# Patient Record
Sex: FEMALE | Race: WHITE
[De-identification: names, ages, dates, MRNs, and addresses within clinical notes are randomized per-mention and may not be internally consistent; named-entity substitution may affect disease eponyms.]

---

## 2017-08-23 NOTE — OR
DATE OF PROCEDURE:  08/22/2017

 

PREOPERATIVE DIAGNOSES:

1. History of gastroesophageal reflux disease.

2. Remote history of colitis (now thought to be primarily irritable bowel syndrome).

 

POSTOPERATIVE DIAGNOSES:

1. Recurrence of hiatal hernia without gross inflammation at esophagogastric junction.

    a.     Mild antral gastritis.

2. Normal colonoscopic examination.

 

OPERATIVE PROCEDURES:

1. Esophagogastroduodenoscopy with:.

    a.     Biopsies esophagogastric junction for histologic evaluation.

    b.     Biopsies of antrum for CLOtest.

2. Flexible colonoscopy.

 

ANESTHESIA:  IV sedation.

 

INDICATION FOR PROCEDURE:  The patient is status post Nissen fundoplication in 2005.  She

was felt to have an endoscopy some time ago to have some recurrence of the stuff.  She is

not presently on any antisecretory medications and has little in the way of reflux symptoms.

Plan is to proceed with upper endoscopy with biopsies as indicated.

 

The patient also has a remote history of some colitis of some sort.  She now thinks that her

diagnosis is more of an irritable bowel syndrome.  She is to undergo a colonoscopy for

evaluation of the colon.  Potential risks of the procedure including bleeding and

perforation were discussed, and the patient wishes to proceed.

 

DETAILS OF PROCEDURE:  The patient was taken to the operating room and after being placed in

a left lateral decubitus position, the upper GI endoscope was passed orally through the

length of the esophagus and stomach with retroflexion view of the fundus, and thereafter

through the pyloric channel into the junction of the third and fourth portions of the

duodenum.

 

Findings included normal hypopharynx, larynx, upper esophageal sphincter, and esophageal

body at the EG junction.  There was no significant inflammation.  There was some recurrence

of the hiatal hernia in a symmetric manner but again there's no stricturing and no gross

inflammation at this distal esophagus.  There was no upper distension of the

gastroesophageal junction mucosal line.

 

Apart from the hiatal hernia within the stomach, there was some erosive gastritis within the

body and antrum.  There were no true ulcers present.  The pyloric channel and visualized

portion of the duodenum were unremarkable.

 

At this point, biopsies were obtained from the antrum and sent for CLOtest for H. pylori and

then multiple biopsies obtained from the esophagogastric junction to evaluate that

histologically.  Minimal bleeding from the biopsy sites was seen and the procedure then

concluded.

 

Attention was then taken to the colonoscopy.  Initial digital rectal exam was performed and

was unremarkable.  Colonoscope was then passed into the rectum where retroflexion revealing

uncomplicated hemorrhoidal columns.  The scope was then passed eventually to the cecum.  The

prep was fairly good with only being a small amount of liquid stool present to that level.

There were no abnormalities noted.  Specifically, there were no areas of colitis or

diverticular disease and no polyps or other signs of neoplasia.  The scope was then

withdrawn.  The above findings reconfirmed.  The procedure was then concluded.

 

Given the erosive gastritis, the patient will be given some Protonix IV in the recovery room

and then we will start her on Zantac 300 mg a day.  She will be following up with Michelle Enrique as arranged.

 

 

 

 

Lino Desai MD

DD:  08/22/2017 14:39:41

DT:  08/22/2017 20:30:35

Job #:  [id number]

## 2021-06-15 ENCOUNTER — HOSPITAL ENCOUNTER (OUTPATIENT)
Dept: HOSPITAL 11 - JP.SDS | Age: 66
Discharge: HOME | End: 2021-06-15
Attending: SURGERY
Payer: MEDICARE

## 2021-06-15 VITALS — HEART RATE: 56 BPM | DIASTOLIC BLOOD PRESSURE: 70 MMHG | SYSTOLIC BLOOD PRESSURE: 106 MMHG

## 2021-06-15 DIAGNOSIS — K64.9: Primary | ICD-10-CM

## 2021-06-15 DIAGNOSIS — E66.9: ICD-10-CM

## 2021-06-15 DIAGNOSIS — Z98.890: ICD-10-CM

## 2021-06-15 DIAGNOSIS — K44.9: ICD-10-CM

## 2021-06-15 DIAGNOSIS — K21.9: ICD-10-CM

## 2021-06-15 PROCEDURE — 43239 EGD BIOPSY SINGLE/MULTIPLE: CPT

## 2021-06-15 PROCEDURE — 88305 TISSUE EXAM BY PATHOLOGIST: CPT

## 2021-06-15 PROCEDURE — 45378 DIAGNOSTIC COLONOSCOPY: CPT

## 2021-06-15 PROCEDURE — 87081 CULTURE SCREEN ONLY: CPT

## 2021-06-23 NOTE — OR
DATE OF PROCEDURE:  06/15/2021

 

SURGEON:  Lino Desai MD

 

PREOPERATIVE DIAGNOSES:

1. History of gastroesophageal reflux disease status post Nissen fundoplication in 2005.

2. History of ulcerative colitis.

 

POSTOPERATIVE DIAGNOSES:

1. Recurrent hiatal hernia status post Nissen fundoplication (2005) with minimal

    inflammation at esophagogastric junction.

2. Normal colonoscopic examination.

 

OPERATIVE PROCEDURES:

1. Esophagogastroduodenoscopy with:

    a.     Biopsy of esophagogastric junction for histologic evaluation.

    b.     Biopsies of antrum for CLOtest.

2. Flexible colonoscopy.

 

ANESTHESIA:  IV sedation.

 

INDICATIONS FOR THE PROCEDURE:  This is a 65-year-old female presenting for followup upper

endoscopy and colonoscopy for the above-noted history.  Presently, the patient has no

medications directed towards the upper GI tract and no significant symptoms of reflux.

Likewise, with regard to her colon, she has had no diarrhea, cramping, or bleeding for now

several years, off any specific treatment for inflammatory bowel disease.  Plan is to

proceed with upper and lower endoscopy with biopsies and polypectomy as indicated.

Potential risks including bleeding and perforation were discussed, and the patient wishes to

proceed.

 

DETAILS OF PROCEDURE:  The patient was taken to the operating room, placed in a left lateral

decubitus position.  IV sedation was administered, after which the upper GI endoscope was

passed orally through the length of the esophagus and into the stomach with retroflexion

view of the fundus, and thereafter through the pyloric channel into the junction of the 3rd

and 4th portions of the duodenum.

 

Findings included normal hypopharynx, larynx, upper esophageal sphincter, and esophageal

body.  At the EG junction, the patient was noted to have some recurrence of the hiatal

hernia, but otherwise intact Nissen fundoplication.  There is no gross inflammation or

stricturing noted and no significant upward migration of columnar mucosa.  The remainder of

the gastric and duodenal exams were unremarkable.  At this point, biopsies were obtained

from the antrum for screening of H pylori.  Then, multiple biopsies were obtained from

esophagogastric junction for screening for Sparrow esophagus.  No bleeding from the biopsy

sites was seen and the procedure then concluded.

 

Attention was then taken to the colonoscopy.  Initial digital rectal exam was performed, it

was unremarkable.  Colonoscope was passed into the rectum with retroflexion revealing

uncomplicated hemorrhoidal columns.  Scope was eventually passed to the level of the cecum.

The patient was noted to have good bowel prep with only a small amount of liquid stool

present.  The colonoscopic examination interestingly was entirely normal with there being no

areas of inflammation, no colitis at all identifiable, and otherwise no diverticular polyps

or other signs of neoplasia.  Scope was then withdrawn.  The above findings reconfirmed.

 

Given the lack of symptoms, we will not treat the recurrent hiatal hernia per se.  The

findings on the colonoscopy along with the absence of any symptoms of inflammatory bowel

disease for now I think raises the question of whether or not the patient actually had at

some point ulcerative colitis.  Nonetheless, we would repeat the colonoscopy in 5 years or

sooner if the symptoms are suggestive of chronic inflammation.

 

 

 

 

Lino Desai MD

DD:  06/22/2021 18:33:21

DT:  06/23/2021 12:00:13

Job #:  [id number]